# Patient Record
Sex: MALE | URBAN - METROPOLITAN AREA
[De-identification: names, ages, dates, MRNs, and addresses within clinical notes are randomized per-mention and may not be internally consistent; named-entity substitution may affect disease eponyms.]

---

## 2022-09-30 ENCOUNTER — APPOINTMENT (RX ONLY)
Dept: URBAN - METROPOLITAN AREA CLINIC 101 | Facility: CLINIC | Age: 37
Setting detail: DERMATOLOGY
End: 2022-09-30

## 2022-09-30 DIAGNOSIS — L65.9 NONSCARRING HAIR LOSS, UNSPECIFIED: ICD-10-CM

## 2022-09-30 DIAGNOSIS — Z71.89 OTHER SPECIFIED COUNSELING: ICD-10-CM

## 2022-09-30 PROCEDURE — ? INTRALESIONAL KENALOG

## 2022-09-30 PROCEDURE — ? SUNSCREEN RECOMMENDATIONS

## 2022-09-30 PROCEDURE — ? COUNSELING

## 2022-09-30 ASSESSMENT — LOCATION ZONE DERM
LOCATION ZONE: FACE
LOCATION ZONE: FACE

## 2022-09-30 ASSESSMENT — LOCATION SIMPLE DESCRIPTION DERM
LOCATION SIMPLE: RIGHT CHEEK
LOCATION SIMPLE: CHIN
LOCATION SIMPLE: CHIN
LOCATION SIMPLE: LEFT CHEEK
LOCATION SIMPLE: LEFT CHEEK
LOCATION SIMPLE: RIGHT CHEEK

## 2022-09-30 ASSESSMENT — LOCATION DETAILED DESCRIPTION DERM
LOCATION DETAILED: LEFT SUPERIOR MEDIAL BUCCAL CHEEK
LOCATION DETAILED: LEFT CHIN
LOCATION DETAILED: RIGHT SUPERIOR LATERAL BUCCAL CHEEK
LOCATION DETAILED: LEFT SUPERIOR LATERAL BUCCAL CHEEK
LOCATION DETAILED: LEFT CHIN
LOCATION DETAILED: RIGHT SUPERIOR MEDIAL BUCCAL CHEEK

## 2022-09-30 NOTE — PROCEDURE: SUNSCREEN RECOMMENDATIONS

## 2022-09-30 NOTE — PROCEDURE: INTRALESIONAL KENALOG
Total Volume Injected (Ccs- Only Use Numbers And Decimals): 0.5cc
X Size Of Lesion In Cm (Optional): 0
Consent: The risks of atrophy were reviewed with the patient.
Include Z78.9 (Other Specified Conditions Influencing Health Status) As An Associated Diagnosis?: No
Kenalog Preparation: Kenalog
Medical Necessity Clause: This procedure was medically necessary because the lesions that were treated were:
Show Inventory Tab: Hide
Administered By (Optional): Robby Kyle PA-C
Detail Level: Detailed
Concentration Of Solution Injected (Mg/Ml): 3.0
Validate Note Data When Using Inventory: Yes

## 2022-11-07 ENCOUNTER — APPOINTMENT (RX ONLY)
Dept: URBAN - METROPOLITAN AREA CLINIC 101 | Facility: CLINIC | Age: 37
Setting detail: DERMATOLOGY
End: 2022-11-07

## 2022-11-07 DIAGNOSIS — L63.8 OTHER ALOPECIA AREATA: ICD-10-CM

## 2022-11-07 PROCEDURE — ? COUNSELING

## 2022-11-07 PROCEDURE — ? INTRALESIONAL KENALOG

## 2022-11-07 ASSESSMENT — LOCATION DETAILED DESCRIPTION DERM
LOCATION DETAILED: RIGHT SUPERIOR MEDIAL FOREHEAD
LOCATION DETAILED: RIGHT MEDIAL FRONTAL SCALP
LOCATION DETAILED: LEFT CENTRAL PARIETAL SCALP
LOCATION DETAILED: MID-OCCIPITAL SCALP
LOCATION DETAILED: RIGHT SUPERIOR PARIETAL SCALP
LOCATION DETAILED: LEFT SUPERIOR PARIETAL SCALP
LOCATION DETAILED: LEFT MEDIAL FRONTAL SCALP
LOCATION DETAILED: LEFT CENTRAL FRONTAL SCALP

## 2022-11-07 ASSESSMENT — LOCATION SIMPLE DESCRIPTION DERM
LOCATION SIMPLE: RIGHT FOREHEAD
LOCATION SIMPLE: POSTERIOR SCALP
LOCATION SIMPLE: SCALP
LOCATION SIMPLE: RIGHT SCALP
LOCATION SIMPLE: LEFT SCALP

## 2022-11-07 ASSESSMENT — LOCATION ZONE DERM
LOCATION ZONE: FACE
LOCATION ZONE: SCALP

## 2022-11-07 NOTE — PROCEDURE: INTRALESIONAL KENALOG
How Many Mls Were Removed From The 80 Mg/Ml (5ml) Vial When Preparing The Injectable Solution?: 0
Validate Note Data When Using Inventory: Yes
Administered By (Optional): Herminio Leonardo PA-C
Medical Necessity Clause: This procedure was medically necessary because the lesions that were treated were:
Kenalog Preparation: Kenalog
Show Inventory Tab: Hide
Consent: The risks of atrophy were reviewed with the patient.
Include Z78.9 (Other Specified Conditions Influencing Health Status) As An Associated Diagnosis?: No
Concentration Of Solution Injected (Mg/Ml): 3.0
Total Volume Injected (Ccs- Only Use Numbers And Decimals): 2.0
Detail Level: Detailed

## 2022-12-27 ENCOUNTER — APPOINTMENT (RX ONLY)
Dept: URBAN - METROPOLITAN AREA CLINIC 101 | Facility: CLINIC | Age: 37
Setting detail: DERMATOLOGY
End: 2022-12-27

## 2022-12-27 DIAGNOSIS — L63.8 OTHER ALOPECIA AREATA: ICD-10-CM

## 2022-12-27 PROCEDURE — ? INTRALESIONAL KENALOG

## 2022-12-27 PROCEDURE — ? LOCATION MARKER (SIMPLE)

## 2022-12-27 PROCEDURE — ? TREATMENT REGIMEN

## 2022-12-27 PROCEDURE — ? COUNSELING

## 2022-12-27 ASSESSMENT — LOCATION DETAILED DESCRIPTION DERM
LOCATION DETAILED: RIGHT INFERIOR MEDIAL BUCCAL CHEEK
LOCATION DETAILED: LEFT SUPERIOR LATERAL BUCCAL CHEEK
LOCATION DETAILED: RIGHT SUBMANDIBULAR AREA
LOCATION DETAILED: LEFT SUBMANDIBULAR AREA
LOCATION DETAILED: LEFT INFERIOR MEDIAL BUCCAL CHEEK

## 2022-12-27 ASSESSMENT — LOCATION SIMPLE DESCRIPTION DERM
LOCATION SIMPLE: RIGHT SUBMANDIBULAR AREA
LOCATION SIMPLE: RIGHT CHEEK
LOCATION SIMPLE: LEFT SUBMANDIBULAR AREA
LOCATION SIMPLE: LEFT CHEEK

## 2022-12-27 ASSESSMENT — LOCATION ZONE DERM: LOCATION ZONE: FACE

## 2022-12-27 NOTE — PROCEDURE: INTRALESIONAL KENALOG
Medical Necessity Clause: This procedure was medically necessary because the lesions that were treated were:
Total Volume Injected (Ccs- Only Use Numbers And Decimals): 2.0 cc
How Many Mls Were Removed From The 40 Mg/Ml (10ml) Vial When Preparing The Injectable Solution?: 0
Consent: The risks of atrophy were reviewed with the patient.
Treatment Number (Optional): 2
Concentration Of Solution Injected (Mg/Ml): 3.0
Validate Note Data When Using Inventory: Yes
Administered By (Optional): Gilford Langdon
Kenalog Preparation: Kenalog
Show Inventory Tab: Hide
Include Z78.9 (Other Specified Conditions Influencing Health Status) As An Associated Diagnosis?: No
Detail Level: Detailed

## 2023-06-16 ENCOUNTER — APPOINTMENT (RX ONLY)
Dept: URBAN - METROPOLITAN AREA CLINIC 101 | Facility: CLINIC | Age: 38
Setting detail: DERMATOLOGY
End: 2023-06-16

## 2023-06-16 DIAGNOSIS — L63.8 OTHER ALOPECIA AREATA: ICD-10-CM

## 2023-06-16 PROCEDURE — ? COUNSELING

## 2023-06-16 PROCEDURE — 11901 INJECT SKIN LESIONS >7: CPT

## 2023-06-16 PROCEDURE — ? INTRALESIONAL KENALOG

## 2023-06-16 PROCEDURE — ? LOCATION MARKER (SIMPLE)

## 2023-06-16 ASSESSMENT — LOCATION SIMPLE DESCRIPTION DERM
LOCATION SIMPLE: LEFT LIP
LOCATION SIMPLE: RIGHT SUBMANDIBULAR AREA
LOCATION SIMPLE: LEFT CHEEK
LOCATION SIMPLE: RIGHT LIP
LOCATION SIMPLE: RIGHT CHEEK
LOCATION SIMPLE: LEFT SUBMANDIBULAR AREA
LOCATION SIMPLE: CHIN

## 2023-06-16 ASSESSMENT — LOCATION DETAILED DESCRIPTION DERM
LOCATION DETAILED: RIGHT INFERIOR MEDIAL BUCCAL CHEEK
LOCATION DETAILED: RIGHT SUPERIOR MEDIAL BUCCAL CHEEK
LOCATION DETAILED: LEFT LOWER CUTANEOUS LIP
LOCATION DETAILED: LEFT SUBMANDIBULAR AREA
LOCATION DETAILED: LEFT SUPERIOR MEDIAL BUCCAL CHEEK
LOCATION DETAILED: RIGHT CHIN
LOCATION DETAILED: LEFT INFERIOR MEDIAL BUCCAL CHEEK
LOCATION DETAILED: RIGHT SUBMANDIBULAR AREA
LOCATION DETAILED: LEFT CHIN
LOCATION DETAILED: RIGHT LOWER CUTANEOUS LIP
LOCATION DETAILED: LEFT SUPERIOR LATERAL BUCCAL CHEEK

## 2023-06-16 ASSESSMENT — LOCATION ZONE DERM
LOCATION ZONE: LIP
LOCATION ZONE: FACE

## 2023-06-16 NOTE — PROCEDURE: INTRALESIONAL KENALOG
Medical Necessity Clause: This procedure was medically necessary because of previous keloid scarring
How Many Mls Were Removed From The 10 Mg/Ml (5ml) Vial When Preparing The Injectable Solution?: 0
Detail Level: Detailed
Consent: The risks of atrophy were reviewed with the patient.
Validate Note Data When Using Inventory: Yes
Kenalog Preparation: Kenalog
Show Inventory Tab: Hide
Administered By (Optional): Sivakumar Evans
Total Volume Injected (Ccs- Only Use Numbers And Decimals): 2.0
Concentration Of Solution Injected (Mg/Ml): 3.0
Ndc# For Kenalog Only: 4065-6198-50
Include Z78.9 (Other Specified Conditions Influencing Health Status) As An Associated Diagnosis?: No